# Patient Record
Sex: MALE | Race: WHITE | NOT HISPANIC OR LATINO | Employment: OTHER | URBAN - METROPOLITAN AREA
[De-identification: names, ages, dates, MRNs, and addresses within clinical notes are randomized per-mention and may not be internally consistent; named-entity substitution may affect disease eponyms.]

---

## 2021-01-15 DIAGNOSIS — Z23 NEED FOR VACCINATION: ICD-10-CM

## 2024-12-27 ENCOUNTER — HOSPITAL ENCOUNTER (INPATIENT)
Facility: MEDICAL CENTER | Age: 74
LOS: 2 days | End: 2024-12-29
Attending: EMERGENCY MEDICINE | Admitting: STUDENT IN AN ORGANIZED HEALTH CARE EDUCATION/TRAINING PROGRAM
Payer: COMMERCIAL

## 2024-12-27 ENCOUNTER — APPOINTMENT (OUTPATIENT)
Dept: RADIOLOGY | Facility: MEDICAL CENTER | Age: 74
End: 2024-12-27
Attending: STUDENT IN AN ORGANIZED HEALTH CARE EDUCATION/TRAINING PROGRAM
Payer: COMMERCIAL

## 2024-12-27 ENCOUNTER — APPOINTMENT (OUTPATIENT)
Dept: RADIOLOGY | Facility: MEDICAL CENTER | Age: 74
End: 2024-12-27
Attending: EMERGENCY MEDICINE
Payer: COMMERCIAL

## 2024-12-27 DIAGNOSIS — Z86.79 HISTORY OF CHF (CONGESTIVE HEART FAILURE): ICD-10-CM

## 2024-12-27 DIAGNOSIS — J18.9 PNEUMONIA OF RIGHT MIDDLE LOBE DUE TO INFECTIOUS ORGANISM: Primary | ICD-10-CM

## 2024-12-27 DIAGNOSIS — R55 SYNCOPE, UNSPECIFIED SYNCOPE TYPE: ICD-10-CM

## 2024-12-27 PROBLEM — Z71.89 ACP (ADVANCE CARE PLANNING): Status: ACTIVE | Noted: 2024-12-27

## 2024-12-27 PROBLEM — I48.91 AF (ATRIAL FIBRILLATION) (HCC): Status: ACTIVE | Noted: 2024-12-27

## 2024-12-27 PROBLEM — I50.20 HEART FAILURE WITH REDUCED EJECTION FRACTION (HCC): Status: ACTIVE | Noted: 2024-12-27

## 2024-12-27 PROBLEM — I42.9 CARDIOMYOPATHY (HCC): Status: ACTIVE | Noted: 2024-12-27

## 2024-12-27 PROBLEM — R74.01 TRANSAMINITIS: Status: ACTIVE | Noted: 2024-12-27

## 2024-12-27 LAB
ALBUMIN SERPL BCP-MCNC: 3.4 G/DL (ref 3.2–4.9)
ALBUMIN/GLOB SERPL: 0.8 G/DL
ALP SERPL-CCNC: 84 U/L (ref 30–99)
ALT SERPL-CCNC: 177 U/L (ref 2–50)
ANION GAP SERPL CALC-SCNC: 15 MMOL/L (ref 7–16)
APPEARANCE UR: CLEAR
AST SERPL-CCNC: 205 U/L (ref 12–45)
BACTERIA #/AREA URNS HPF: ABNORMAL /HPF
BACTERIA BLD CULT: NORMAL
BACTERIA BLD CULT: NORMAL
BASOPHILS # BLD AUTO: 0 % (ref 0–1.8)
BASOPHILS # BLD: 0 K/UL (ref 0–0.12)
BILIRUB SERPL-MCNC: 1.4 MG/DL (ref 0.1–1.5)
BILIRUB UR QL STRIP.AUTO: ABNORMAL
BUN SERPL-MCNC: 22 MG/DL (ref 8–22)
CALCIUM ALBUM COR SERPL-MCNC: 9.4 MG/DL (ref 8.5–10.5)
CALCIUM SERPL-MCNC: 8.9 MG/DL (ref 8.5–10.5)
CASTS URNS QL MICRO: ABNORMAL /LPF (ref 0–2)
CHLORIDE SERPL-SCNC: 103 MMOL/L (ref 96–112)
CO2 SERPL-SCNC: 18 MMOL/L (ref 20–33)
COLOR UR: ABNORMAL
CREAT SERPL-MCNC: 0.98 MG/DL (ref 0.5–1.4)
EKG IMPRESSION: NORMAL
EOSINOPHIL # BLD AUTO: 0 K/UL (ref 0–0.51)
EOSINOPHIL NFR BLD: 0 % (ref 0–6.9)
EPITHELIAL CELLS 1715: ABNORMAL /HPF (ref 0–5)
ERYTHROCYTE [DISTWIDTH] IN BLOOD BY AUTOMATED COUNT: 46.1 FL (ref 35.9–50)
GFR SERPLBLD CREATININE-BSD FMLA CKD-EPI: 80 ML/MIN/1.73 M 2
GLOBULIN SER CALC-MCNC: 4.4 G/DL (ref 1.9–3.5)
GLUCOSE SERPL-MCNC: 112 MG/DL (ref 65–99)
GLUCOSE UR STRIP.AUTO-MCNC: >=1000 MG/DL
HCT VFR BLD AUTO: 36.5 % (ref 42–52)
HGB BLD-MCNC: 12.2 G/DL (ref 14–18)
KETONES UR STRIP.AUTO-MCNC: 15 MG/DL
LEUKOCYTE ESTERASE UR QL STRIP.AUTO: NEGATIVE
LYMPHOCYTES # BLD AUTO: 0.91 K/UL (ref 1–4.8)
LYMPHOCYTES NFR BLD: 7 % (ref 22–41)
MAGNESIUM SERPL-MCNC: 1.9 MG/DL (ref 1.5–2.5)
MANUAL DIFF BLD: NORMAL
MCH RBC QN AUTO: 32.9 PG (ref 27–33)
MCHC RBC AUTO-ENTMCNC: 33.4 G/DL (ref 32.3–36.5)
MCV RBC AUTO: 98.4 FL (ref 81.4–97.8)
MICRO URNS: ABNORMAL
MONOCYTES # BLD AUTO: 1.13 K/UL (ref 0–0.85)
MONOCYTES NFR BLD AUTO: 8.7 % (ref 0–13.4)
MORPHOLOGY BLD-IMP: NORMAL
NEUTROPHILS # BLD AUTO: 10.96 K/UL (ref 1.82–7.42)
NEUTROPHILS NFR BLD: 80 % (ref 44–72)
NEUTS BAND NFR BLD MANUAL: 4.3 % (ref 0–10)
NITRITE UR QL STRIP.AUTO: NEGATIVE
NRBC # BLD AUTO: 0 K/UL
NRBC BLD-RTO: 0 /100 WBC (ref 0–0.2)
NT-PROBNP SERPL IA-MCNC: 3310 PG/ML (ref 0–125)
PH UR STRIP.AUTO: 5 [PH] (ref 5–8)
PLATELET # BLD AUTO: 294 K/UL (ref 164–446)
PLATELET BLD QL SMEAR: NORMAL
PMV BLD AUTO: 9 FL (ref 9–12.9)
POTASSIUM SERPL-SCNC: 3.7 MMOL/L (ref 3.6–5.5)
PROCALCITONIN SERPL-MCNC: 3.5 NG/ML
PROT SERPL-MCNC: 7.8 G/DL (ref 6–8.2)
PROT UR QL STRIP: 30 MG/DL
RBC # BLD AUTO: 3.71 M/UL (ref 4.7–6.1)
RBC # URNS HPF: ABNORMAL /HPF (ref 0–2)
RBC BLD AUTO: NORMAL
RBC UR QL AUTO: ABNORMAL
SIGNIFICANT IND 70042: NORMAL
SIGNIFICANT IND 70042: NORMAL
SITE SITE: NORMAL
SITE SITE: NORMAL
SODIUM SERPL-SCNC: 136 MMOL/L (ref 135–145)
SOURCE SOURCE: NORMAL
SOURCE SOURCE: NORMAL
SP GR UR STRIP.AUTO: 1.03
TROPONIN T SERPL-MCNC: 20 NG/L (ref 6–19)
TROPONIN T SERPL-MCNC: 25 NG/L (ref 6–19)
UROBILINOGEN UR STRIP.AUTO-MCNC: 1 EU/DL
WBC # BLD AUTO: 13 K/UL (ref 4.8–10.8)
WBC #/AREA URNS HPF: ABNORMAL /HPF

## 2024-12-27 PROCEDURE — 85027 COMPLETE CBC AUTOMATED: CPT

## 2024-12-27 PROCEDURE — 70450 CT HEAD/BRAIN W/O DYE: CPT

## 2024-12-27 PROCEDURE — 81001 URINALYSIS AUTO W/SCOPE: CPT

## 2024-12-27 PROCEDURE — 96365 THER/PROPH/DIAG IV INF INIT: CPT

## 2024-12-27 PROCEDURE — 700111 HCHG RX REV CODE 636 W/ 250 OVERRIDE (IP): Mod: JZ | Performed by: EMERGENCY MEDICINE

## 2024-12-27 PROCEDURE — 84145 PROCALCITONIN (PCT): CPT

## 2024-12-27 PROCEDURE — 99497 ADVNCD CARE PLAN 30 MIN: CPT | Performed by: STUDENT IN AN ORGANIZED HEALTH CARE EDUCATION/TRAINING PROGRAM

## 2024-12-27 PROCEDURE — 36415 COLL VENOUS BLD VENIPUNCTURE: CPT

## 2024-12-27 PROCEDURE — 76705 ECHO EXAM OF ABDOMEN: CPT

## 2024-12-27 PROCEDURE — 99285 EMERGENCY DEPT VISIT HI MDM: CPT

## 2024-12-27 PROCEDURE — 84484 ASSAY OF TROPONIN QUANT: CPT | Mod: 91

## 2024-12-27 PROCEDURE — 83735 ASSAY OF MAGNESIUM: CPT

## 2024-12-27 PROCEDURE — A9270 NON-COVERED ITEM OR SERVICE: HCPCS | Performed by: STUDENT IN AN ORGANIZED HEALTH CARE EDUCATION/TRAINING PROGRAM

## 2024-12-27 PROCEDURE — 87040 BLOOD CULTURE FOR BACTERIA: CPT | Mod: 91

## 2024-12-27 PROCEDURE — 93005 ELECTROCARDIOGRAM TRACING: CPT | Mod: TC | Performed by: EMERGENCY MEDICINE

## 2024-12-27 PROCEDURE — 99223 1ST HOSP IP/OBS HIGH 75: CPT | Performed by: STUDENT IN AN ORGANIZED HEALTH CARE EDUCATION/TRAINING PROGRAM

## 2024-12-27 PROCEDURE — 700105 HCHG RX REV CODE 258: Performed by: STUDENT IN AN ORGANIZED HEALTH CARE EDUCATION/TRAINING PROGRAM

## 2024-12-27 PROCEDURE — 700102 HCHG RX REV CODE 250 W/ 637 OVERRIDE(OP): Performed by: STUDENT IN AN ORGANIZED HEALTH CARE EDUCATION/TRAINING PROGRAM

## 2024-12-27 PROCEDURE — 700102 HCHG RX REV CODE 250 W/ 637 OVERRIDE(OP): Performed by: EMERGENCY MEDICINE

## 2024-12-27 PROCEDURE — 80053 COMPREHEN METABOLIC PANEL: CPT

## 2024-12-27 PROCEDURE — 700105 HCHG RX REV CODE 258: Performed by: EMERGENCY MEDICINE

## 2024-12-27 PROCEDURE — 71045 X-RAY EXAM CHEST 1 VIEW: CPT

## 2024-12-27 PROCEDURE — 83880 ASSAY OF NATRIURETIC PEPTIDE: CPT

## 2024-12-27 PROCEDURE — A9270 NON-COVERED ITEM OR SERVICE: HCPCS | Performed by: EMERGENCY MEDICINE

## 2024-12-27 PROCEDURE — 85007 BL SMEAR W/DIFF WBC COUNT: CPT

## 2024-12-27 PROCEDURE — 770020 HCHG ROOM/CARE - TELE (206)

## 2024-12-27 RX ORDER — FLUTICASONE PROPIONATE 50 MCG
1 SPRAY, SUSPENSION (ML) NASAL DAILY
COMMUNITY

## 2024-12-27 RX ORDER — LIDOCAINE 50 MG/G
1 PATCH TOPICAL EVERY 24 HOURS
COMMUNITY
End: 2025-01-13

## 2024-12-27 RX ORDER — AZITHROMYCIN 250 MG/1
500 TABLET, FILM COATED ORAL ONCE
Status: COMPLETED | OUTPATIENT
Start: 2024-12-27 | End: 2024-12-27

## 2024-12-27 RX ORDER — AZITHROMYCIN 250 MG/1
500 TABLET, FILM COATED ORAL DAILY
Status: COMPLETED | OUTPATIENT
Start: 2024-12-28 | End: 2024-12-29

## 2024-12-27 RX ORDER — AMIODARONE HYDROCHLORIDE 200 MG/1
200 TABLET ORAL DAILY
COMMUNITY

## 2024-12-27 RX ORDER — SPIRONOLACTONE 25 MG/1
12.5 TABLET ORAL
Status: DISCONTINUED | OUTPATIENT
Start: 2024-12-28 | End: 2024-12-29 | Stop reason: HOSPADM

## 2024-12-27 RX ORDER — EPLERENONE 25 MG/1
25 TABLET, FILM COATED ORAL DAILY
COMMUNITY

## 2024-12-27 RX ORDER — SACUBITRIL AND VALSARTAN 24; 26 MG/1; MG/1
0.5 TABLET, FILM COATED ORAL 2 TIMES DAILY
COMMUNITY

## 2024-12-27 RX ORDER — LABETALOL HYDROCHLORIDE 5 MG/ML
10 INJECTION, SOLUTION INTRAVENOUS EVERY 4 HOURS PRN
Status: DISCONTINUED | OUTPATIENT
Start: 2024-12-27 | End: 2024-12-29

## 2024-12-27 RX ORDER — MEXILETINE HYDROCHLORIDE 150 MG/1
150 CAPSULE ORAL 3 TIMES DAILY
COMMUNITY

## 2024-12-27 RX ORDER — ONDANSETRON 4 MG/1
4 TABLET, ORALLY DISINTEGRATING ORAL EVERY 4 HOURS PRN
Status: DISCONTINUED | OUTPATIENT
Start: 2024-12-27 | End: 2024-12-29 | Stop reason: HOSPADM

## 2024-12-27 RX ORDER — MEXILETINE HYDROCHLORIDE 150 MG/1
150 CAPSULE ORAL 3 TIMES DAILY
Status: DISCONTINUED | OUTPATIENT
Start: 2024-12-27 | End: 2024-12-29 | Stop reason: HOSPADM

## 2024-12-27 RX ORDER — SODIUM CHLORIDE 9 MG/ML
INJECTION, SOLUTION INTRAVENOUS CONTINUOUS
Status: DISCONTINUED | OUTPATIENT
Start: 2024-12-27 | End: 2024-12-28

## 2024-12-27 RX ORDER — METOPROLOL SUCCINATE 25 MG/1
12.5 TABLET, EXTENDED RELEASE ORAL DAILY
COMMUNITY

## 2024-12-27 RX ORDER — ONDANSETRON 2 MG/ML
4 INJECTION INTRAMUSCULAR; INTRAVENOUS EVERY 4 HOURS PRN
Status: DISCONTINUED | OUTPATIENT
Start: 2024-12-27 | End: 2024-12-29 | Stop reason: HOSPADM

## 2024-12-27 RX ORDER — ACETAMINOPHEN 325 MG/1
650 TABLET ORAL EVERY 6 HOURS PRN
Status: DISCONTINUED | OUTPATIENT
Start: 2024-12-27 | End: 2024-12-29 | Stop reason: HOSPADM

## 2024-12-27 RX ORDER — MEGESTROL ACETATE 40 MG/1
20 TABLET ORAL 4 TIMES DAILY
COMMUNITY

## 2024-12-27 RX ORDER — METOPROLOL SUCCINATE 25 MG/1
12.5 TABLET, EXTENDED RELEASE ORAL DAILY
Status: DISCONTINUED | OUTPATIENT
Start: 2024-12-28 | End: 2024-12-29 | Stop reason: HOSPADM

## 2024-12-27 RX ORDER — EPLERENONE 25 MG/1
25 TABLET, FILM COATED ORAL DAILY
Status: DISCONTINUED | OUTPATIENT
Start: 2024-12-28 | End: 2024-12-27

## 2024-12-27 RX ADMIN — MEXILETINE HYDROCHLORIDE 150 MG: 150 CAPSULE ORAL at 22:49

## 2024-12-27 RX ADMIN — AMPICILLIN AND SULBACTAM 3 G: 2; 1 INJECTION, POWDER, FOR SOLUTION INTRAVENOUS at 20:56

## 2024-12-27 RX ADMIN — SACUBITRIL AND VALSARTAN 0.5 TABLET: 24; 26 TABLET, FILM COATED ORAL at 22:48

## 2024-12-27 RX ADMIN — AZITHROMYCIN DIHYDRATE 500 MG: 250 TABLET ORAL at 20:57

## 2024-12-27 RX ADMIN — SODIUM CHLORIDE: 9 INJECTION, SOLUTION INTRAVENOUS at 22:50

## 2024-12-27 RX ADMIN — APIXABAN 5 MG: 5 TABLET, FILM COATED ORAL at 22:48

## 2024-12-27 ASSESSMENT — CHA2DS2 SCORE
SEX: MALE
PRIOR STROKE OR TIA OR THROMBOEMBOLISM: NO
CHA2DS2 VASC SCORE: 4
AGE 75 OR GREATER: YES
HYPERTENSION: NO
CHF OR LEFT VENTRICULAR DYSFUNCTION: YES
DIABETES: NO
AGE 65 TO 74: NO
VASCULAR DISEASE: YES

## 2024-12-27 ASSESSMENT — ENCOUNTER SYMPTOMS
WEAKNESS: 1
EYES NEGATIVE: 1
LOSS OF CONSCIOUSNESS: 1
MUSCULOSKELETAL NEGATIVE: 1
SHORTNESS OF BREATH: 1
COUGH: 1
CARDIOVASCULAR NEGATIVE: 1
PSYCHIATRIC NEGATIVE: 1
GASTROINTESTINAL NEGATIVE: 1

## 2024-12-28 LAB
ALBUMIN SERPL BCP-MCNC: 3.1 G/DL (ref 3.2–4.9)
ALBUMIN/GLOB SERPL: 0.7 G/DL
ALP SERPL-CCNC: 87 U/L (ref 30–99)
ALT SERPL-CCNC: 396 U/L (ref 2–50)
ANION GAP SERPL CALC-SCNC: 16 MMOL/L (ref 7–16)
AST SERPL-CCNC: 523 U/L (ref 12–45)
BASOPHILS # BLD AUTO: 0 % (ref 0–1.8)
BASOPHILS # BLD: 0 K/UL (ref 0–0.12)
BILIRUB SERPL-MCNC: 1.3 MG/DL (ref 0.1–1.5)
BUN SERPL-MCNC: 22 MG/DL (ref 8–22)
CALCIUM ALBUM COR SERPL-MCNC: 9.6 MG/DL (ref 8.5–10.5)
CALCIUM SERPL-MCNC: 8.9 MG/DL (ref 8.5–10.5)
CHLORIDE SERPL-SCNC: 103 MMOL/L (ref 96–112)
CO2 SERPL-SCNC: 17 MMOL/L (ref 20–33)
CREAT SERPL-MCNC: 0.95 MG/DL (ref 0.5–1.4)
EOSINOPHIL # BLD AUTO: 0 K/UL (ref 0–0.51)
EOSINOPHIL NFR BLD: 0 % (ref 0–6.9)
ERYTHROCYTE [DISTWIDTH] IN BLOOD BY AUTOMATED COUNT: 47.3 FL (ref 35.9–50)
GFR SERPLBLD CREATININE-BSD FMLA CKD-EPI: 83 ML/MIN/1.73 M 2
GLOBULIN SER CALC-MCNC: 4.3 G/DL (ref 1.9–3.5)
GLUCOSE SERPL-MCNC: 107 MG/DL (ref 65–99)
HCT VFR BLD AUTO: 35.6 % (ref 42–52)
HGB BLD-MCNC: 12 G/DL (ref 14–18)
LYMPHOCYTES # BLD AUTO: 1.24 K/UL (ref 1–4.8)
LYMPHOCYTES NFR BLD: 9.6 % (ref 22–41)
MANUAL DIFF BLD: NORMAL
MCH RBC QN AUTO: 33.3 PG (ref 27–33)
MCHC RBC AUTO-ENTMCNC: 33.7 G/DL (ref 32.3–36.5)
MCV RBC AUTO: 98.9 FL (ref 81.4–97.8)
MONOCYTES # BLD AUTO: 1.01 K/UL (ref 0–0.85)
MONOCYTES NFR BLD AUTO: 7.8 % (ref 0–13.4)
MORPHOLOGY BLD-IMP: NORMAL
NEUTROPHILS # BLD AUTO: 10.66 K/UL (ref 1.82–7.42)
NEUTROPHILS NFR BLD: 82.6 % (ref 44–72)
NRBC # BLD AUTO: 0 K/UL
NRBC BLD-RTO: 0 /100 WBC (ref 0–0.2)
PLATELET # BLD AUTO: 258 K/UL (ref 164–446)
PLATELET BLD QL SMEAR: NORMAL
PMV BLD AUTO: 8.8 FL (ref 9–12.9)
POTASSIUM SERPL-SCNC: 3.7 MMOL/L (ref 3.6–5.5)
PROT SERPL-MCNC: 7.4 G/DL (ref 6–8.2)
RBC # BLD AUTO: 3.6 M/UL (ref 4.7–6.1)
RBC BLD AUTO: NORMAL
SODIUM SERPL-SCNC: 136 MMOL/L (ref 135–145)
WBC # BLD AUTO: 12.9 K/UL (ref 4.8–10.8)

## 2024-12-28 PROCEDURE — 97535 SELF CARE MNGMENT TRAINING: CPT

## 2024-12-28 PROCEDURE — 97163 PT EVAL HIGH COMPLEX 45 MIN: CPT

## 2024-12-28 PROCEDURE — A9270 NON-COVERED ITEM OR SERVICE: HCPCS | Performed by: STUDENT IN AN ORGANIZED HEALTH CARE EDUCATION/TRAINING PROGRAM

## 2024-12-28 PROCEDURE — 700105 HCHG RX REV CODE 258: Performed by: STUDENT IN AN ORGANIZED HEALTH CARE EDUCATION/TRAINING PROGRAM

## 2024-12-28 PROCEDURE — 700111 HCHG RX REV CODE 636 W/ 250 OVERRIDE (IP): Mod: JZ | Performed by: STUDENT IN AN ORGANIZED HEALTH CARE EDUCATION/TRAINING PROGRAM

## 2024-12-28 PROCEDURE — 85007 BL SMEAR W/DIFF WBC COUNT: CPT

## 2024-12-28 PROCEDURE — 36415 COLL VENOUS BLD VENIPUNCTURE: CPT

## 2024-12-28 PROCEDURE — 97166 OT EVAL MOD COMPLEX 45 MIN: CPT

## 2024-12-28 PROCEDURE — 700102 HCHG RX REV CODE 250 W/ 637 OVERRIDE(OP): Performed by: STUDENT IN AN ORGANIZED HEALTH CARE EDUCATION/TRAINING PROGRAM

## 2024-12-28 PROCEDURE — 85027 COMPLETE CBC AUTOMATED: CPT

## 2024-12-28 PROCEDURE — 80053 COMPREHEN METABOLIC PANEL: CPT

## 2024-12-28 PROCEDURE — 700105 HCHG RX REV CODE 258: Performed by: INTERNAL MEDICINE

## 2024-12-28 PROCEDURE — 99233 SBSQ HOSP IP/OBS HIGH 50: CPT | Performed by: INTERNAL MEDICINE

## 2024-12-28 PROCEDURE — 770020 HCHG ROOM/CARE - TELE (206)

## 2024-12-28 PROCEDURE — 700111 HCHG RX REV CODE 636 W/ 250 OVERRIDE (IP): Mod: JZ | Performed by: INTERNAL MEDICINE

## 2024-12-28 RX ORDER — SODIUM CHLORIDE 9 MG/ML
500 INJECTION, SOLUTION INTRAVENOUS ONCE
Status: COMPLETED | OUTPATIENT
Start: 2024-12-28 | End: 2024-12-28

## 2024-12-28 RX ADMIN — AMPICILLIN AND SULBACTAM 3 G: 1; 2 INJECTION, POWDER, FOR SOLUTION INTRAMUSCULAR; INTRAVENOUS at 12:51

## 2024-12-28 RX ADMIN — APIXABAN 5 MG: 5 TABLET, FILM COATED ORAL at 05:19

## 2024-12-28 RX ADMIN — SACUBITRIL AND VALSARTAN 0.5 TABLET: 24; 26 TABLET, FILM COATED ORAL at 05:18

## 2024-12-28 RX ADMIN — MEXILETINE HYDROCHLORIDE 150 MG: 150 CAPSULE ORAL at 13:54

## 2024-12-28 RX ADMIN — SACUBITRIL AND VALSARTAN 0.5 TABLET: 24; 26 TABLET, FILM COATED ORAL at 17:13

## 2024-12-28 RX ADMIN — SODIUM CHLORIDE 500 ML: 9 INJECTION, SOLUTION INTRAVENOUS at 10:28

## 2024-12-28 RX ADMIN — AMPICILLIN AND SULBACTAM 3 G: 2; 1 INJECTION, POWDER, FOR SOLUTION INTRAVENOUS at 03:51

## 2024-12-28 RX ADMIN — MEXILETINE HYDROCHLORIDE 150 MG: 150 CAPSULE ORAL at 22:05

## 2024-12-28 RX ADMIN — APIXABAN 5 MG: 5 TABLET, FILM COATED ORAL at 17:13

## 2024-12-28 RX ADMIN — ACETAMINOPHEN 650 MG: 325 TABLET ORAL at 20:56

## 2024-12-28 RX ADMIN — AZITHROMYCIN DIHYDRATE 500 MG: 250 TABLET ORAL at 17:13

## 2024-12-28 RX ADMIN — SPIRONOLACTONE 12.5 MG: 25 TABLET ORAL at 05:19

## 2024-12-28 RX ADMIN — MEXILETINE HYDROCHLORIDE 150 MG: 150 CAPSULE ORAL at 05:19

## 2024-12-28 RX ADMIN — AMPICILLIN AND SULBACTAM 3 G: 1; 2 INJECTION, POWDER, FOR SOLUTION INTRAMUSCULAR; INTRAVENOUS at 17:18

## 2024-12-28 SDOH — ECONOMIC STABILITY: TRANSPORTATION INSECURITY
IN THE PAST 12 MONTHS, HAS LACK OF RELIABLE TRANSPORTATION KEPT YOU FROM MEDICAL APPOINTMENTS, MEETINGS, WORK OR FROM GETTING THINGS NEEDED FOR DAILY LIVING?: NO

## 2024-12-28 SDOH — ECONOMIC STABILITY: TRANSPORTATION INSECURITY
IN THE PAST 12 MONTHS, HAS THE LACK OF TRANSPORTATION KEPT YOU FROM MEDICAL APPOINTMENTS OR FROM GETTING MEDICATIONS?: NO

## 2024-12-28 ASSESSMENT — PATIENT HEALTH QUESTIONNAIRE - PHQ9
SUM OF ALL RESPONSES TO PHQ QUESTIONS 1-9: 10
SUM OF ALL RESPONSES TO PHQ9 QUESTIONS 1 AND 2: 2
6. FEELING BAD ABOUT YOURSELF - OR THAT YOU ARE A FAILURE OR HAVE LET YOURSELF OR YOUR FAMILY DOWN: SEVERAL DAYS
5. POOR APPETITE OR OVEREATING: SEVERAL DAYS
1. LITTLE INTEREST OR PLEASURE IN DOING THINGS: SEVERAL DAYS
7. TROUBLE CONCENTRATING ON THINGS, SUCH AS READING THE NEWSPAPER OR WATCHING TELEVISION: SEVERAL DAYS
4. FEELING TIRED OR HAVING LITTLE ENERGY: MORE THAN HALF THE DAYS
3. TROUBLE FALLING OR STAYING ASLEEP OR SLEEPING TOO MUCH: NEARLY EVERY DAY
2. FEELING DOWN, DEPRESSED, IRRITABLE, OR HOPELESS: SEVERAL DAYS
8. MOVING OR SPEAKING SO SLOWLY THAT OTHER PEOPLE COULD HAVE NOTICED. OR THE OPPOSITE, BEING SO FIGETY OR RESTLESS THAT YOU HAVE BEEN MOVING AROUND A LOT MORE THAN USUAL: NOT AT ALL
9. THOUGHTS THAT YOU WOULD BE BETTER OFF DEAD, OR OF HURTING YOURSELF: NOT AT ALL

## 2024-12-28 ASSESSMENT — SOCIAL DETERMINANTS OF HEALTH (SDOH)
WITHIN THE LAST YEAR, HAVE YOU BEEN AFRAID OF YOUR PARTNER OR EX-PARTNER?: NO
WITHIN THE PAST 12 MONTHS, THE FOOD YOU BOUGHT JUST DIDN'T LAST AND YOU DIDN'T HAVE MONEY TO GET MORE: NEVER TRUE
WITHIN THE LAST YEAR, HAVE YOU BEEN KICKED, HIT, SLAPPED, OR OTHERWISE PHYSICALLY HURT BY YOUR PARTNER OR EX-PARTNER?: NO
WITHIN THE LAST YEAR, HAVE YOU BEEN HUMILIATED OR EMOTIONALLY ABUSED IN OTHER WAYS BY YOUR PARTNER OR EX-PARTNER?: NO
IN THE PAST 12 MONTHS, HAS THE ELECTRIC, GAS, OIL, OR WATER COMPANY THREATENED TO SHUT OFF SERVICE IN YOUR HOME?: NO
WITHIN THE PAST 12 MONTHS, YOU WORRIED THAT YOUR FOOD WOULD RUN OUT BEFORE YOU GOT THE MONEY TO BUY MORE: NEVER TRUE
WITHIN THE LAST YEAR, HAVE TO BEEN RAPED OR FORCED TO HAVE ANY KIND OF SEXUAL ACTIVITY BY YOUR PARTNER OR EX-PARTNER?: NO

## 2024-12-28 ASSESSMENT — COGNITIVE AND FUNCTIONAL STATUS - GENERAL
DAILY ACTIVITIY SCORE: 20
MOVING FROM LYING ON BACK TO SITTING ON SIDE OF FLAT BED: A LITTLE
WALKING IN HOSPITAL ROOM: A LITTLE
SUGGESTED CMS G CODE MODIFIER DAILY ACTIVITY: CJ
TURNING FROM BACK TO SIDE WHILE IN FLAT BAD: A LITTLE
DRESSING REGULAR UPPER BODY CLOTHING: A LITTLE
SUGGESTED CMS G CODE MODIFIER MOBILITY: CK
DRESSING REGULAR LOWER BODY CLOTHING: A LOT
STANDING UP FROM CHAIR USING ARMS: A LITTLE
HELP NEEDED FOR BATHING: A LITTLE
MOVING TO AND FROM BED TO CHAIR: A LITTLE
CLIMB 3 TO 5 STEPS WITH RAILING: A LOT
MOBILITY SCORE: 17

## 2024-12-28 ASSESSMENT — ENCOUNTER SYMPTOMS
LOSS OF CONSCIOUSNESS: 1
NAUSEA: 0
DEPRESSION: 0
FEVER: 0
WEAKNESS: 1
CHILLS: 0
SHORTNESS OF BREATH: 0
ABDOMINAL PAIN: 0
MYALGIAS: 0
HEADACHES: 0
DIZZINESS: 0

## 2024-12-28 ASSESSMENT — LIFESTYLE VARIABLES
AVERAGE NUMBER OF DAYS PER WEEK YOU HAVE A DRINK CONTAINING ALCOHOL: 0
HAVE PEOPLE ANNOYED YOU BY CRITICIZING YOUR DRINKING: NO
CONSUMPTION TOTAL: NEGATIVE
TOTAL SCORE: 0
ON A TYPICAL DAY WHEN YOU DRINK ALCOHOL HOW MANY DRINKS DO YOU HAVE: 0
EVER FELT BAD OR GUILTY ABOUT YOUR DRINKING: NO
ALCOHOL_USE: NO
TOTAL SCORE: 0
DOES PATIENT WANT TO STOP DRINKING: NO
EVER HAD A DRINK FIRST THING IN THE MORNING TO STEADY YOUR NERVES TO GET RID OF A HANGOVER: NO
HOW MANY TIMES IN THE PAST YEAR HAVE YOU HAD 5 OR MORE DRINKS IN A DAY: 0
HAVE YOU EVER FELT YOU SHOULD CUT DOWN ON YOUR DRINKING: NO
TOTAL SCORE: 0

## 2024-12-28 ASSESSMENT — GAIT ASSESSMENTS
ASSISTIVE DEVICE: FRONT WHEEL WALKER
GAIT LEVEL OF ASSIST: CONTACT GUARD ASSIST
DEVIATION: BRADYKINETIC
DISTANCE (FEET): 10

## 2024-12-28 ASSESSMENT — ACTIVITIES OF DAILY LIVING (ADL): TOILETING: INDEPENDENT

## 2024-12-28 ASSESSMENT — PAIN DESCRIPTION - PAIN TYPE
TYPE: ACUTE PAIN
TYPE: ACUTE PAIN

## 2024-12-28 ASSESSMENT — FIBROSIS 4 INDEX
FIB4 SCORE: 7.74
FIB4 SCORE: 3.98

## 2024-12-28 NOTE — ASSESSMENT & PLAN NOTE
Endorses fever chills and dry cough.  Found to have leukocytosis and elevated procalcitonin.  Chest x-ray showing pneumonitis  Unasyn and azithromycin  Oxygen as needed

## 2024-12-28 NOTE — ASSESSMENT & PLAN NOTE
Per chart review, has improved ejection fraction, initially 10%, now 30 to 40%  Gentle hydration  Resume home medications

## 2024-12-28 NOTE — THERAPY
"Occupational Therapy   Initial Evaluation     Patient Name: Armand Patel  Age:  74 y.o., Sex:  male  Medical Record #: [id number]  Today's Date: 12/28/2024     Precautions  Precautions: (P) Fall Risk    Assessment  Patient is 74 y.o. male admitted d/t syncope. PMHx: nonischemic cardiomyopathy with EF 35 to 40% and CRT-D, pulmonary embolism on Eliquis, severe aortic stenosis, ventricular tachycardia on amiodarone   Pt seen for OT eval. Pt with positive orthostatic hypotension, most likely contributing to syncope and fall at home. Pt required overall CGA for functional mobility with use of FWW. Pt educ on use of sock aide for Lb dressing d/t difficulty managing LB dressing. Pt educ on home safety, DME and A/E use. Pt will continue to benefit from inpt OT, will be safe to dc home when medically  stable.     Plan    Occupational Therapy Initial Treatment Plan   Treatment Interventions: (P) Self Care / Activities of Daily Living, Neuro Re-Education / Balance, Therapeutic Exercises, Therapeutic Activity, Adaptive Equipment  Treatment Frequency: (P) 4 Times per Week  Duration: (P) Until Therapy Goals Met    DC Equipment Recommendations: (P) None  Discharge Recommendations: (P) Recommend home health for continued occupational therapy services     Subjective    \"That would be helpful\" (sock aide)      Objective       12/28/24 0936   Prior Living Situation   Prior Services Home-Independent   Housing / Facility 2 Story House   Steps Into Home   (ramp to enter)   Steps In Home   (FOS)   Elevator Yes   Bathroom Set up Walk In Shower;Grab Bars;Shower Chair   Equipment Owned 4-Wheel Walker;Grab Bar(s) In Tub / Shower;Tub / Shower Seat;Grab Bar(s) By Toilet   Lives with - Patient's Self Care Capacity Spouse;Adult Children;Other (Comments)   Comments Pt lives with spouse, adult son and  2 grandchildren under the age of 8   Prior Level of ADL Function   Self Feeding Independent   Grooming / Hygiene Independent   Bathing Independent "   Dressing Independent   Toileting Independent   Prior Level of IADL Function   Medication Management Independent   Laundry Requires Assist   Kitchen Mobility Independent   Finances Requires Assist   Home Management Requires Assist   Shopping Requires Assist   Prior Level Of Mobility Independent With Device in Community   Driving / Transportation Relatives / Others Provide Transportation   History of Falls   History of Falls Yes   Date of Last Fall   (reason for admit)   Precautions   Precautions Fall Risk   Vitals   Patient BP Position Orthostatic:;Supine;Standing 1 minute;Standing 3 minutes   Vitals Comments 94/58 improved once up in chair   Pain   Intervention Declines   Pain 0 - 10 Group   Therapist Pain Assessment During Activity;Post Activity Pain Same as Prior to Activity;Nurse Notified  (did not c/o pain)   Cognition    Cognition / Consciousness WDL   Level of Consciousness Alert   Comments very pleasant and cooperative, receptive to all educ   Passive ROM Upper Body   Passive ROM Upper Body WDL   Active ROM Upper Body   Active ROM Upper Body  WDL   Dominant Hand Right   Strength Upper Body   Upper Body Strength  X   Gross Strength Generalized Weakness, Equal Bilaterally.    Sensation Upper Body   Upper Extremity Sensation  WDL   Upper Body Muscle Tone   Upper Body Muscle Tone  WDL   Neurological Concerns   Neurological Concerns No   Coordination Upper Body   Coordination WDL   Balance Assessment   Sitting Balance (Static) Good   Sitting Balance (Dynamic) Fair   Standing Balance (Static) Fair   Standing Balance (Dynamic) Fair -   Weight Shift Sitting Good   Weight Shift Standing Fair   Comments w/FWW   Bed Mobility    Supine to Sit Minimal Assist   Scooting Standby Assist   Rolling Standby Assist   Comments HOB flat   ADL Assessment   Eating Independent   Grooming Standing;Standby Assist  (oral care)   Upper Body Dressing Minimal Assist   Lower Body Dressing Moderate Assist  (with use of sock aide able to  don, intiially modA)   Toileting Standby Assist   Functional Mobility   Sit to Stand Contact Guard Assist   Bed, Chair, Wheelchair Transfer Contact Guard Assist   Toilet Transfers Contact Guard Assist   Transfer Method Stand Step   Mobility sup>sit EOB> in rm> bathroom> chair   Comments w/FWW   Visual Perception   Visual Perception  WDL   Edema / Skin Assessment   Edema / Skin  WDL   Activity Tolerance   Sitting in Chair post session   Sitting Edge of Bed 5 min   Standing 5+ min   Patient / Family Goals   Patient / Family Goal #1 to go home   Short Term Goals   Short Term Goal # 1 Pt will complete LB dressing with A/E supervised   Short Term Goal # 2 Pt will complete toileting supervised   Short Term Goal # 3 pt will complete standing grooming tasks supervised   Short Term Goal # 4 pt will complete toilet tranfers supervised   Education Group   Education Provided Role of Occupational Therapist;Activities of Daily Living   Role of Occupational Therapist Patient Response Patient;Acceptance;Explanation;Demonstration;Verbal Demonstration;Action Demonstration   ADL Patient Response Patient;Acceptance;Explanation;Demonstration;Verbal Demonstration;Action Demonstration   Occupational Therapy Initial Treatment Plan    Treatment Interventions Self Care / Activities of Daily Living;Neuro Re-Education / Balance;Therapeutic Exercises;Therapeutic Activity;Adaptive Equipment   Treatment Frequency 4 Times per Week   Duration Until Therapy Goals Met   Problem List   Problem List Decreased Active Daily Living Skills;Decreased Functional Mobility;Decreased Activity Tolerance   Anticipated Discharge Equipment and Recommendations   DC Equipment Recommendations None   Discharge Recommendations Recommend home health for continued occupational therapy services   Interdisciplinary Plan of Care Collaboration   IDT Collaboration with  Nursing;Physical Therapist   Patient Position at End of Therapy Seated;Chair Alarm On;Tray Table within  Reach;Call Light within Reach   Collaboration Comments RN updated   Session Information   Date / Session Number  12/28, #1 (1/4, 1/3)       Patient seen for team evaluation with Physical Therapist for the following reason(s):  Therapy sessions needed to be done by a certain time period for both disciplines, and did not impede patient's progress.. Occupational Therapist facilitated ADL performance while Physical Therapist simultaneously treated pt according to POC.

## 2024-12-28 NOTE — CARE PLAN
The patient is Watcher - Medium risk of patient condition declining or worsening    Shift Goals  Clinical Goals: safety, ECHO  Patient Goals: rest  Family Goals: not present    Progress made toward(s) clinical / shift goals:  safety, ECHO    Patient is not progressing towards the following goals:

## 2024-12-28 NOTE — PROGRESS NOTES
Assumed care of patient; received beside report from Ramya MILLER. Patient voices no needs at this time.

## 2024-12-28 NOTE — ED NOTES
Med Rec completed per [facility] and spouse on the phone.  [place] [phone] ( phone # in Demographic is wrong)    Allergies reviewed: yes    Antibiotics in the past 30 days: no    Anticoagulant in past 14 days: yes    Anticoagulant:Eliquis 5 mg  Last dose: 12/27/2024    Pharmacy patient utilizes: [facility]  [phone]  ext [phone]

## 2024-12-28 NOTE — PROGRESS NOTES
4 Eyes Skin Assessment Completed by ANGELA Monge and ANGELA Stephen.    Head WDL  Ears WDL  Nose WDL  Mouth WDL  Neck WDL  Breast/Chest WDL  Shoulder Blades Redness, Scabbing  Spine Redness  (R) Arm/Elbow/Hand Bruising  (L) Arm/Elbow/Hand Bruising and Scab  Abdomen WDL  Groin WDL  Scrotum/Coccyx/Buttocks Redness and Blanching  (R) Leg Redness and Bruising  (L) Leg Scab and Bruising  (R) Heel/Foot/Toe Blanching and Boggy  (L) Heel/Foot/Toe Blanching and Boggy          Devices In Places Tele Box and Pulse Ox      Interventions In Place Pillows    Possible Skin Injury No    Pictures Uploaded Into Epic Yes  Wound Consult Placed N/A  RN Wound Prevention Protocol Ordered Yes

## 2024-12-28 NOTE — THERAPY
"Physical Therapy   Initial Evaluation     Patient Name: Armadn Patel  Age:  74 y.o., Sex:  male  Medical Record #: [id number]  Today's Date: 12/28/2024     Precautions  Precautions: Fall Risk    Assessment  Patient is 74 y.o. male who states he lost his balance and fell. Reports he did lose consciousness for \"a few minutes\". Flu like symptoms. Dx'd with R sided PNA, syncope.  Hx of nonischemic cardiomyopathy with EF 35 to 40% and CRT-D, pulmonary embolism, on Eliquis, severe aortic stenosis, ventricular tachycardia, PPM, afib.     Pt presents with the following Problems: positive orthostatic hypotension Blood pressure, no c/o dizziness, Pain, Impaired Bed Mobility, Impaired Transfers, Impaired Ambulation, Decreased Activity Tolerance, Impaired Balance. Please see flowsheet below for information. Pt is currently below their functional baseline and anticipate that pt will benefit from home with home health and home with family assistance upon DC from hospital. Pt will also benefit from nursing to assist pt with mobility to include the following: up to chair for meals, ambulate in halls, and Wrote information on patient's dry erase board and notified RN..    Plan    Physical Therapy Initial Treatment Plan   Treatment Plan : Bed Mobility, Gait Training, Neuro Re-Education / Balance, Therapeutic Activities, Therapeutic Exercise, Self Care / Home Evaluation  Treatment Frequency: 4 Times per Week  Duration: Until Therapy Goals Met    DC Equipment Recommendations: None  Discharge Recommendations: Recommend home health for continued physical therapy services       Subjective    Pt with eyes closed upon entry. Agreed to PT/OT.      Objective       12/28/24 0933   Time In/Time Out   Therapy Start Time 0915   Therapy End Time 0933   Total Therapy Time 18   Initial Contact Note    Initial Contact Note Order Received and Verified, Physical Therapy Evaluation in Progress with Full Report to Follow.   Precautions   Precautions Fall " Risk   Vitals   Patient BP Position Orthostatic:   Blood Pressure    (supine 117/61 HR 80, seated 94/58 HR 89, standing 85/58 , seated after amb to/from /60 HR 90. No c/o dizziness. Notified Dr Lorenzo and RN.)   O2 Delivery Device None - Room Air   Pain 0 - 10 Group   Therapist Pain Assessment   (no c/o pain)   Prior Living Situation   Prior Services Home-Independent   Housing / Facility 2 Story House   Steps Into Home   (ramp)   Steps In Home   (1 flight of stairs, has an elevator)   Elevator Yes   Bathroom Set up Walk In Shower;Grab Bars;Shower Chair   Equipment Owned 4-Wheel Walker;Grab Bar(s) In Tub / Shower;Tub / Shower Seat;Grab Bar(s) By Toilet;Hospital Bed   Lives with - Patient's Self Care Capacity Spouse;Adult Children;Other (Comments)  (grandchildren)   Comments spouse recently DC'd from hospital and has    Prior Level of Functional Mobility   Ambulation Independent   Ambulation Distance community   Assistive Devices Used 4-Wheel Walker   Comments uses 4WW downstairs, no AD upstairs uses furniture   History of Falls   History of Falls Yes   Date of Last Fall 12/27/24  (lost balance and possible syncope)   Cognition    Cognition / Consciousness WDL   Level of Consciousness Alert   Comments pleasant, receptive, receptive to education   Active ROM Lower Body    Active ROM Lower Body  WDL   Strength Lower Body   Lower Body Strength  WDL   Coordination Lower Body    Coordination Lower Body  WDL   Balance Assessment   Sitting Balance (Static) Good   Sitting Balance (Dynamic) Fair   Standing Balance (Static) Fair   Standing Balance (Dynamic) Fair -   Weight Shift Sitting Good   Weight Shift Standing Fair   Comments standing with FWW   Bed Mobility    Supine to Sit Minimal Assist   Scooting Standby Assist   Rolling Standby Assist   Comments HOB flat -pt has a hospital bed   Gait Analysis   Gait Level Of Assist Contact Guard Assist   Assistive Device Front Wheel Walker   Distance (Feet) 10   # of  Times Distance was Traveled 2   Deviation Bradykinetic   Functional Mobility   Sit to Stand Contact Guard Assist   Bed, Chair, Wheelchair Transfer Contact Guard Assist   Transfer Method Stand Step   6 Clicks Assessment - How much HELP from from another person do you currently need... (If the patient hasn't done an activity recently, how much help from another person do you think he/she would need if he/she tried?)   Turning from your back to your side while in a flat bed without using bedrails? 3   Moving from lying on your back to sitting on the side of a flat bed without using bedrails? 3   Moving to and from a bed to a chair (including a wheelchair)? 3   Standing up from a chair using your arms (e.g., wheelchair, or bedside chair)? 3   Walking in hospital room? 3   Climbing 3-5 steps with a railing? 2   6 clicks Mobility Score 17   Activity Tolerance   Sitting in Chair post session   Sitting Edge of Bed 5 minutes   Standing 8 minutes total   Comments limited by fatigue and weakness   Short Term Goals    Short Term Goal # 1 Pt will perform supine to/from sit with flat bed and no features supervised in 6 visits to progress bed mobility   Short Term Goal # 2 Pt will perform sit to/from stand with FWW supervised in 6 visits to progress transfers   Short Term Goal # 3 Pt will amb with FWW 200ft supervised in 6 visits to progress with functional household mobility.   Education Group   Education Provided Role of Physical Therapist;Gait Training;Use of Assistive Device   Role of Physical Therapist Patient Response Patient;Acceptance;Explanation;Verbal Demonstration   Gait Training Patient Response Patient;Acceptance;Explanation;Action Demonstration   Use of Assistive Device Patient Response Patient;Acceptance;Explanation;Action Demonstration   Additional Comments Fall prevention measures- home modifications (remove floor rugs, install night lights), wear safe non skid shoes, careful with positional changes.   Physical  Therapy Initial Treatment Plan    Treatment Plan  Bed Mobility;Gait Training;Neuro Re-Education / Balance;Therapeutic Activities;Therapeutic Exercise;Self Care / Home Evaluation   Treatment Frequency 4 Times per Week   Duration Until Therapy Goals Met   Problem List    Problems Pain;Impaired Bed Mobility;Impaired Transfers;Impaired Ambulation;Decreased Activity Tolerance;Impaired Balance   Anticipated Discharge Equipment and Recommendations   DC Equipment Recommendations None   Discharge Recommendations Recommend home health for continued physical therapy services   Interdisciplinary Plan of Care Collaboration   IDT Collaboration with  Nursing;Occupational Therapist   Patient Position at End of Therapy Seated;Chair Alarm On;Tray Table within Reach;Call Light within Reach   Collaboration Comments RN updated     Patient seen for team evaluation with Occupational Therapist for the following reason(s):  Due to the medical complexity, the skill of both practitioners is needed to monitor vitals, patient status, and adjust the intervention to fit the patient's needs and goals.. Physical Therapist facilitated amb, standing balance during Adls, activity tolerance while Occupational Therapist simultaneously treated pt according to POC.

## 2024-12-28 NOTE — ASSESSMENT & PLAN NOTE
Noted to have transaminitis with  and   Right upper quadrant ultrasound shows sludge but no evidence of acute cholecystitis.  Hold amiodarone for now

## 2024-12-28 NOTE — PROGRESS NOTES
Picked up pt from ED and brought to floor. Assessment completed.Pt A&Ox 4. Respirations are even and unlabored on room air. Tele monitor in place, call light and belongings are within reach. POC updated. Pt educated on room and call light, pt verbalized understanding. Needs met.

## 2024-12-28 NOTE — CARE PLAN
The patient is Stable - Low risk of patient condition declining or worsening    Shift Goals  Clinical Goals: safety, pain control  Patient Goals: rest  Family Goals: not present      Problem: Pain - Standard  Goal: Alleviation of pain or a reduction in pain to the patient’s comfort goal  12/28/2024 0629 by Ramya Dumont RBrittanyN.  Outcome: Progressing     Problem: Knowledge Deficit - Standard  Goal: Patient and family/care givers will demonstrate understanding of plan of care, disease process/condition, diagnostic tests and medications  12/28/2024 0629 by Ramay Dumont R.N.  Outcome: Progressing     Problem: Fall Risk  Goal: Patient will remain free from falls  12/28/2024 0629 by Ramya Dumont R.N.  Outcome: Progressing

## 2024-12-28 NOTE — PROGRESS NOTES
Monitor summary: Paced 73-76, CO -0.17, QRS -0.11, QT -0.41, with a BBB per strip from the monitor room.

## 2024-12-28 NOTE — H&P
"Hospital Medicine History & Physical Note    Date of Service  12/27/2024    Primary Care Physician  No primary care provider on file.    Consultants  None    Code Status  DNAR/DNI    Chief Complaint  Chief Complaint   Patient presents with    Fall     Pt states he lost his balance and fell. Reports he did lose consciousness for \"a few minutes\". - head strike. +thinners, does not know what blood thinner.  Pt reports chronic shoulder pain.    Flu Like Symptoms     X1 week.       History of Presenting Illness  Armand Patel is a 74 y.o. male who presented 12/27/2024 with a syncopal episode.  For the last few days, patient noted to have subjective fever, chills, productive cough.  Today, he lost his balance when trying to get up after trying to grab his walker.  He had a brief episode of loss of consciousness.  He continued to feel weak after, he was then brought to the ER for further evaluation.    Patient has had an extensive cardiac history.  He has a history of nonischemic cardiomyopathy with EF 35 to 40% and CRT-D, pulmonary embolism on Eliquis, severe aortic stenosis, ventricular tachycardia on amiodarone    In ER, patient found to have normal vital signs.  Pertinent labs include neutrophil leukocytosis.  Found to have transaminitis  and .  CT head negative.  Chest x-ray showing a right-sided pneumonia.  Patient admitted for syncope and pneumonia.    I discussed the plan of care with patient.    Review of Systems  Review of Systems   Constitutional:  Positive for malaise/fatigue.   HENT: Negative.     Eyes: Negative.    Respiratory:  Positive for cough and shortness of breath.    Cardiovascular: Negative.    Gastrointestinal: Negative.    Genitourinary: Negative.    Musculoskeletal: Negative.    Skin: Negative.    Neurological:  Positive for loss of consciousness and weakness.   Endo/Heme/Allergies: Negative.    Psychiatric/Behavioral: Negative.         Past Medical History   has no past medical " history on file.    Surgical History   has no past surgical history on file.     Family History  family history is not on file.   Family history reviewed with patient. There is no family history that is pertinent to the chief complaint.     Social History       Allergies  Not on File    Medications  None       Physical Exam  Temp:  [36.9 °C (98.4 °F)] 36.9 °C (98.4 °F)  Pulse:  [85-99] 85  Resp:  [16-18] 18  BP: (114-121)/(57-62) 121/62  SpO2:  [90 %-93 %] 93 %  Blood Pressure : 121/62   Temperature: 36.9 °C (98.4 °F)   Pulse: 85   Respiration: 18   Pulse Oximetry: 93 %       Physical Exam  Constitutional:       Comments: Thin appearing male   HENT:      Head: Normocephalic.      Mouth/Throat:      Mouth: Mucous membranes are moist.   Eyes:      Pupils: Pupils are equal, round, and reactive to light.   Cardiovascular:      Rate and Rhythm: Normal rate and regular rhythm.      Pulses: Normal pulses.      Comments:   Defibrillator in place  Pulmonary:      Effort: Pulmonary effort is normal.      Breath sounds: Normal breath sounds.   Abdominal:      General: Abdomen is flat. Bowel sounds are normal.      Palpations: Abdomen is soft.   Musculoskeletal:         General: Normal range of motion.   Skin:     General: Skin is warm.   Neurological:      General: No focal deficit present.      Mental Status: He is alert and oriented to person, place, and time. Mental status is at baseline.   Psychiatric:         Mood and Affect: Mood normal.         Behavior: Behavior normal.         Thought Content: Thought content normal.         Judgment: Judgment normal.         Laboratory:  Recent Labs     12/27/24 1842   WBC 13.0*   RBC 3.71*   HEMOGLOBIN 12.2*   HEMATOCRIT 36.5*   MCV 98.4*   MCH 32.9   MCHC 33.4   RDW 46.1   PLATELETCT 294   MPV 9.0     Recent Labs     12/27/24  1842   SODIUM 136   POTASSIUM 3.7   CHLORIDE 103   CO2 18*   GLUCOSE 112*   BUN 22   CREATININE 0.98   CALCIUM 8.9     Recent Labs     12/27/24 1842    ALTSGPT 177*   ASTSGOT 205*   ALKPHOSPHAT 84   TBILIRUBIN 1.4   GLUCOSE 112*         Recent Labs     12/27/24  1842   NTPROBNP 3310*         Recent Labs     12/27/24  1842   TROPONINT 25*       Imaging:  CT-HEAD W/O   Final Result      1.  No CT evidence of acute infarct, hemorrhage or mass.   2.  Mild to moderate ethmoid sinus disease.            DX-CHEST-PORTABLE (1 VIEW)   Final Result      1.  Wedge-shaped airspace opacity in right medial lung base consistent with pneumonitis and/or atelectasis.a          EKG:  I have personally reviewed the images and compared with prior images.    Assessment/Plan:  Justification for Admission Status  I anticipate this patient will require at least two midnights for appropriate medical management, necessitating inpatient admission because pt has syncope and cap    Patient will need a Telemetry bed on MEDICAL service .  The need is secondary to syncope.    * Syncope and collapse- (present on admission)  Assessment & Plan  Patient presents for a syncopal episodes today at home when trying to ambulate with his walker.  Unable to interrogate in the ED as patient does not know what pacemaker/defibrillator he has  CT head negative, EKG showing paced rhythm.  Syncope secondary to aortic stenosis a possibility, echocardiogram in a.m.  Trend troponin  Telemetry  PT OT    Transaminitis  Assessment & Plan  Noted to have transaminitis with  and   Right upper quadrant ultrasound shows sludge but no evidence of acute cholecystitis.  Hold amiodarone for now    History of ventricular tachycardia  Assessment & Plan  Has history of ventricular tachycardia.  Has pacemaker/defibrillator in place      AF (atrial fibrillation) ([facility])  Assessment & Plan  Continue home medications    Cardiomyopathy ([facility])  Assessment & Plan  Has nonischemic cardiomyopathy  Continue home medications    Heart failure with reduced ejection fraction ([facility])  Assessment & Plan  Per chart review, has improved  ejection fraction, initially 10%, now 30 to 40%  Gentle hydration  Resume home medications    CAP (community acquired pneumonia)  Assessment & Plan  Endorses fever chills and dry cough.  Found to have leukocytosis and elevated procalcitonin.  Chest x-ray showing pneumonitis  Unasyn and azithromycin  Oxygen as needed    ACP (advance care planning)  Assessment & Plan  16 minutes spent discussing goals of care with patient.  We went over the process of CPR, which can include chest compressions and intubation.  At this time he would like to be DNR/DNI        VTE prophylaxis: therapeutic anticoagulation with eliquis

## 2024-12-28 NOTE — ED PROVIDER NOTES
"ER Provider Note    Scribed for Liam Solorio II, M.D. by Naveen Orona. 12/27/2024  6:01 PM    Primary Care Provider: No primary care provider noted.    CHIEF COMPLAINT   Chief Complaint   Patient presents with    Fall     Pt states he lost his balance and fell. Reports he did lose consciousness for \"a few minutes\". - head strike. +thinners, does not know what blood thinner.  Pt reports chronic shoulder pain.    Flu Like Symptoms     X1 week.         HPI/ROS  LIMITATION TO HISTORY   Select: Poor historian.   OUTSIDE HISTORIAN(S):  Significant other Wife provides majority of patient medical history.  See HPI below    Armand Patel is a 74 y.o. male who presents to the ED via EMS for evaluation of a fall as a result of a syncopal event onset this afternoon. The patient reports he lost his balance when he was unable to grab his walker and fell, noting loss of consciousness for a short period after. The patient is not sure if he fell as a result of losing his balance or because he lost consciousness. The wife later reports the patient fell when he lost his footing while grabbing his walker and appeared incoherent after hitting the ground. The patient notes chest pain earlier today. The patient denies feeling sick recently. The patient is on blood thinners. He has a history of heart conditions and has a pacemaker and defibrillator placed in June. The patient states he has only been hospitalized once before for cardiac issues.     The patient's wife reports the patient was seen a year ago in an ED in [place] for COVID where the patient had PVCs, but was discharged on Mucinex and recommended to hydrate. He was seen again for the same and was discharged. The patient drove home to [place] from [place] while having an ejection fraction of 10%. The patient;s wife reports she placed the patient on oxygen and later admitted to the ICU after going to the ED at that time. The patient's wife also notes the patient's ejection " "fraction improved to 30-40%. The wife denies the patient having any recent sick contacts.  But he has appeared sick the past few days, has been coughing.    Attempted to review outside records but [facility] and these records are not linked at this time.    PAST MEDICAL HISTORY  See HPI.  History of CHF.  Has defibrillator and pacemaker.  He and his family do not know what type of pacemaker it is.    SURGICAL HISTORY  Fibrillator and pacemaker.    FAMILY HISTORY  No family history noted.    SOCIAL HISTORY   Lives with his wife    CURRENT MEDICATIONS  Previous Medications    No medications noted.     ALLERGIES  Patient has no allergy information on record.    PHYSICAL EXAM  BP (P) 114/57   Pulse (P) 99   Temp (P) 36.9 °C (98.4 °F) (Temporal)   Resp (P) 16   Ht (P) 1.88 m (6' 2\")   Wt (P) 65.3 kg (144 lb)   SpO2 (P) 92%   BMI (P) 18.49 kg/m²   Physical Exam  Vitals and nursing note reviewed.   HENT:      Head: Normocephalic and atraumatic.      Nose: Nose normal.      Mouth/Throat:      Mouth: Mucous membranes are moist.   Eyes:      Extraocular Movements: Extraocular movements intact.      Pupils: Pupils are equal, round, and reactive to light.   Cardiovascular:      Rate and Rhythm: Normal rate and regular rhythm.      Heart sounds: No murmur heard.  Pulmonary:      Effort: Pulmonary effort is normal. No respiratory distress.      Comments: No Rales  Abdominal:      General: There is no distension.      Tenderness: There is no abdominal tenderness.   Musculoskeletal:         General: No swelling or tenderness. Normal range of motion.      Cervical back: Normal range of motion. No rigidity or tenderness.   Skin:     General: Skin is warm.   Neurological:      General: No focal deficit present.      Mental Status: He is alert and oriented to person, place, and time.      Cranial Nerves: No cranial nerve deficit.      Motor: No weakness.   Psychiatric:         Mood and Affect: Mood normal.     DIAGNOSTIC " STUDIES    EKG/LABS  Results for orders placed or performed during the hospital encounter of 12/27/24   CBC WITH DIFFERENTIAL    Collection Time: 12/27/24  6:42 PM   Result Value Ref Range    WBC 13.0 (H) 4.8 - 10.8 K/uL    RBC 3.71 (L) 4.70 - 6.10 M/uL    Hemoglobin 12.2 (L) 14.0 - 18.0 g/dL    Hematocrit 36.5 (L) 42.0 - 52.0 %    MCV 98.4 (H) 81.4 - 97.8 fL    MCH 32.9 27.0 - 33.0 pg    MCHC 33.4 32.3 - 36.5 g/dL    RDW 46.1 35.9 - 50.0 fL    Platelet Count 294 164 - 446 K/uL    MPV 9.0 9.0 - 12.9 fL    Neutrophils-Polys 80.00 (H) 44.00 - 72.00 %    Lymphocytes 7.00 (L) 22.00 - 41.00 %    Monocytes 8.70 0.00 - 13.40 %    Eosinophils 0.00 0.00 - 6.90 %    Basophils 0.00 0.00 - 1.80 %    Nucleated RBC 0.00 0.00 - 0.20 /100 WBC    Neutrophils (Absolute) 10.96 (H) 1.82 - 7.42 K/uL    Lymphs (Absolute) 0.91 (L) 1.00 - 4.80 K/uL    Monos (Absolute) 1.13 (H) 0.00 - 0.85 K/uL    Eos (Absolute) 0.00 0.00 - 0.51 K/uL    Baso (Absolute) 0.00 0.00 - 0.12 K/uL    NRBC (Absolute) 0.00 K/uL   COMP METABOLIC PANEL    Collection Time: 12/27/24  6:42 PM   Result Value Ref Range    Sodium 136 135 - 145 mmol/L    Potassium 3.7 3.6 - 5.5 mmol/L    Chloride 103 96 - 112 mmol/L    Co2 18 (L) 20 - 33 mmol/L    Anion Gap 15.0 7.0 - 16.0    Glucose 112 (H) 65 - 99 mg/dL    Bun 22 8 - 22 mg/dL    Creatinine 0.98 0.50 - 1.40 mg/dL    Calcium 8.9 8.5 - 10.5 mg/dL    Correct Calcium 9.4 8.5 - 10.5 mg/dL    AST(SGOT) 205 (H) 12 - 45 U/L    ALT(SGPT) 177 (H) 2 - 50 U/L    Alkaline Phosphatase 84 30 - 99 U/L    Total Bilirubin 1.4 0.1 - 1.5 mg/dL    Albumin 3.4 3.2 - 4.9 g/dL    Total Protein 7.8 6.0 - 8.2 g/dL    Globulin 4.4 (H) 1.9 - 3.5 g/dL    A-G Ratio 0.8 g/dL   TROPONIN    Collection Time: 12/27/24  6:42 PM   Result Value Ref Range    Troponin T 25 (H) 6 - 19 ng/L   URINALYSIS,CULTURE IF INDICATED    Collection Time: 12/27/24  6:42 PM    Specimen: Urine   Result Value Ref Range    Color Orange (A)     Character Clear     Specific Gravity  1.028 <1.035    Ph 5.0 5.0 - 8.0    Glucose >=1000 (A) Negative mg/dL    Ketones 15 (A) Negative mg/dL    Protein 30 (A) Negative mg/dL    Bilirubin Small (A) Negative    Urobilinogen, Urine 1.0 <=1.0 EU/dL    Nitrite Negative Negative    Leukocyte Esterase Negative Negative    Occult Blood Trace (A) Negative    Micro Urine Req Microscopic    MAGNESIUM    Collection Time: 24  6:42 PM   Result Value Ref Range    Magnesium 1.9 1.5 - 2.5 mg/dL   proBrain Natriuretic Peptide, NT    Collection Time: 24  6:42 PM   Result Value Ref Range    NT-proBNP 3310 (H) 0 - 125 pg/mL   URINE MICROSCOPIC (W/UA)    Collection Time: 24  6:42 PM   Result Value Ref Range    WBC 0-2 /hpf    RBC 0-2 0 - 2 /hpf    Bacteria None Seen None /hpf    Epithelial Cells 0-2 0 - 5 /hpf    Urine Casts 3-5 (A) 0 - 2 /lpf   ESTIMATED GFR    Collection Time: 24  6:42 PM   Result Value Ref Range    GFR (CKD-EPI) 80 >60 mL/min/1.73 m 2   DIFFERENTIAL MANUAL    Collection Time: 24  6:42 PM   Result Value Ref Range    Bands-Stabs 4.30 0.00 - 10.00 %    Manual Diff Status PERFORMED    PERIPHERAL SMEAR REVIEW    Collection Time: 24  6:42 PM   Result Value Ref Range    Peripheral Smear Review see below    PLATELET ESTIMATE    Collection Time: 24  6:42 PM   Result Value Ref Range    Plt Estimation Normal    MORPHOLOGY    Collection Time: 24  6:42 PM   Result Value Ref Range    RBC Morphology Normal    PROCALCITONIN    Collection Time: 24  6:42 PM   Result Value Ref Range    Procalcitonin 3.50 (H) <0.25 ng/mL   EKG    Collection Time: 24  6:45 PM   Result Value Ref Range    Report       [facility] Emergency Dept.    Test Date:  2024  Pt Name:    KERWIN NGUYEN                Department: ER  MRN:        [id number]                      Room:       [facility]  Gender:     Male                         Technician: [id number]  :        [id number]                   Requested By:ER TRIAGE  PROTOCOL  Order #:    [id number]                    Reading MD:    Measurements  Intervals                                Axis  Rate:       90                           P:          100  AR:         201                          QRS:        -86  QRSD:       132                          T:          110  QT:         387  QTc:        474    Interpretive Statements  Atrial-sensed ventricular-paced rhythm  No further analysis attempted due to paced rhythm  No previous ECG available for comparison     BLOOD CULTURE    Collection Time: 12/27/24  8:40 PM    Specimen: Peripheral; Blood   Result Value Ref Range    Significant Indicator NEG     Source BLD     Site PERIPHERAL     Culture Result       No Growth  Note: Blood cultures are incubated for 5 days and  are monitored continuously.Positive blood cultures  are called to the RN and reported as soon as  they are identified.     BLOOD CULTURE    Collection Time: 12/27/24  8:40 PM    Specimen: Peripheral; Blood   Result Value Ref Range    Significant Indicator NEG     Source BLD     Site PERIPHERAL     Culture Result       No Growth  Note: Blood cultures are incubated for 5 days and  are monitored continuously.Positive blood cultures  are called to the RN and reported as soon as  they are identified.     TROPONIN    Collection Time: 12/27/24  9:46 PM   Result Value Ref Range    Troponin T 20 (H) 6 - 19 ng/L      I have independently interpreted this EKG     RADIOLOGY/PROCEDURES   The attending emergency physician has independently interpreted the diagnostic imaging associated with this visit and am waiting the final reading from the radiologist.   My preliminary interpretation is a follows: CXR: Possible right sided pneumonia    Radiologist interpretation:  US-RUQ   Final Result         1. There is sludge noted within the gallbladder. There is no evidence for cholelithiasis or acute cholecystitis.   2. Right renal cysts.   3. Midline structures are obscured by bowel gas.       CT-HEAD W/O   Final Result      1.  No CT evidence of acute infarct, hemorrhage or mass.   2.  Mild to moderate ethmoid sinus disease.            DX-CHEST-PORTABLE (1 VIEW)   Final Result      1.  Wedge-shaped airspace opacity in right medial lung base consistent with pneumonitis and/or atelectasis.a      EC-ECHOCARDIOGRAM COMPLETE W/O CONT    (Results Pending)     COURSE & MEDICAL DECISION MAKING     ASSESSMENT, COURSE AND PLAN  Care Narrative:     6:09 PM - This is an emergency department evaluation of a 74 y.o. male who presents following a fall and  loss of consciousness onset this afternoon. He has history of CHF, wife reports EF had been as low as 10%. He has defibrillator. Unclear if he had syncopal episode or mechanical fall. According to wife he has been unwell the past few days. Will screen for ICH with CT of the head because he is on anticoagulants, has a fall and cannot recall falling. Plan to chest for metabolic abnormalities. He does seem altered, needs redirection. Wife requested we screen for UTI and viral syndrome. No known history of UTI. Plan to also interrogate pacemaker.     7:59 PM - CT head without hemorrhage. CXR shows airspace abnormality at the right medial lung base. WBC 13. Will give dose of antibiotics to treat pneumonia.  Pro-Jey will be sent.  The patient is on room air with normal saturations, proBNP is elevated. Unclear what baseline proBNP is. Does not look like he is in CHF exacerbation. Plan for admission for syncope and possible pneumonia. Troponin mildly elevated, but not chest pain and EKG does not look like an acute MI. Pagemariusz hospitalist.     8:06 PM - I discussed the patient's case and the above findings with Dr. Stewart (Hospitalist) who agrees to evaluate the patient for hospitalization. He was also able to see some [facility] that show the patient has aortic stenosis which gives further reason for hospitalization.     8:11 PM - The patient was reevaluated at bedside. I  informed the patient of my conversation with Dr. Stewart and my plan for hospitalization.  Pro-Jey returned elevated at 3.5.    PROBLEM LIST  # Syncope/fall with closed head injury   -Unclear if loss of consciousness was due to fall or if he had a syncopal event.  CT of the head without hemorrhage.    # Pneumonia   -Has had a cough, findings of elevated white blood cell count, opacities at the right lung, elevated Pro-Jey    # Elevated AST/ALT   -No right upper quadrant tenderness.  No comparisons.  Could be chronic fatty liver disease.  No known alcohol use recently.    # History of CHF   -proBNP increased, but no comparison.  He does not appear fluid overloaded    DISPOSITION AND DISCUSSIONS  I have discussed management of the patient with the following physicians and ALETHEA's:  Dr. Stewart (Hospitalist)     Discussion of management with other hospitals or appropriate source(s): None     DISPOSITION:  Patient will be hospitalized by Dr. Stewart in guarded condition.    FINAL DIAGNOSIS  1. Pneumonia of right middle lobe due to infectious organism Active   2. Syncope, unspecified syncope type    3. History of CHF (congestive heart failure)      Naveen DAILY (Becki), am scribing for, and in the presence of, WESTON Odell II.    Electronically signed by: Naveen Orona (Becki), 12/27/2024    Liam DAILY II, M* personally performed the services described in this documentation, as scribed by Naveen Orona in my presence, and it is both accurate and complete.     The note accurately reflects work and decisions made by me.  Liam Solorio II, M.D.  12/28/2024  2:56 AM

## 2024-12-28 NOTE — ED TRIAGE NOTES
"Chief Complaint   Patient presents with    Fall     Pt states he lost his balance and fell. Reports he did lose consciousness for \"a few minutes\". - head strike. +thinners, does not know what blood thinner.  Pt reports chronic shoulder pain.    Flu Like Symptoms     X1 week.     BIB EMS from home for above. Pt is AAOx4, speaking in full sentences, answers questions appropriately.    BP (P) 114/57   Pulse (P) 99   Temp (P) 36.9 °C (98.4 °F) (Temporal)   Resp (P) 16   Ht (P) 1.88 m (6' 2\")   Wt (P) 65.3 kg (144 lb)   SpO2 (P) 92%   BMI (P) 18.49 kg/m²     "

## 2024-12-28 NOTE — PROGRESS NOTES
[facility] Medicine Daily Progress Note    Date of Service  12/28/2024    Chief Complaint  Armand Patel is a 74 y.o. male admitted 12/27/2024 with syncope.    Hospital Course  74 y.o. male who presented 12/27/2024 with a syncopal episode.  For the last few days, patient noted to have subjective fever, chills, productive cough.  Today, he lost his balance when trying to get up after trying to grab his walker.  He had a brief episode of loss of consciousness. He continued to feel weak after, he was then brought to the ER for further evaluation.     Patient has had an extensive cardiac history.  He has a history of nonischemic cardiomyopathy with EF 35 to 40% and CRT-D, pulmonary embolism on Eliquis, severe aortic stenosis, ventricular tachycardia on amiodarone     In ER, patient found to have normal vital signs.  Pertinent labs include neutrophil leukocytosis.  Found to have transaminitis  and .  CT head negative.  Chest x-ray showing a right-sided pneumonia.  Patient admitted for syncope and pneumonia.       Interval Problem Update  12/28 patient with tachypnea otherwise vital stable on room air  WBC 12.9, hemoglobin 12  Renal function stable  ,   Troponin 20  Blood cultures pending  Right upper quadrant ultrasound showed sludge no acute cholecystitis  Echo pending   Telemetry reviewed patient paced rhythm  Discussed with PT/OT recommended home health. Noted positive orthostatics, 500 ml bolus ordered   Patient reports he is feeling better overall, still tired feels shaky. Reports cough, no overt dyspnea.     I have discussed this patient's plan of care and discharge plan at IDT rounds today with Case Management, Nursing, Nursing leadership, and other members of the IDT team.    Consultants/Specialty  none    Code Status  DNAR/DNI    Disposition  The patient is not medically cleared for discharge to home or a post-acute facility.  Anticipate discharge to: home with organized home healthcare  and close outpatient follow-up    I have placed the appropriate orders for post-discharge needs.    Review of Systems  Review of Systems   Constitutional:  Positive for malaise/fatigue. Negative for chills and fever.   Respiratory:  Negative for shortness of breath.    Cardiovascular:  Negative for chest pain.   Gastrointestinal:  Negative for abdominal pain and nausea.   Musculoskeletal:  Negative for myalgias.   Skin:  Negative for rash.   Neurological:  Positive for loss of consciousness and weakness. Negative for dizziness and headaches.   Psychiatric/Behavioral:  Negative for depression.    All other systems reviewed and are negative.       Physical Exam  Temp:  [36.5 °C (97.7 °F)-37 °C (98.6 °F)] 36.6 °C (97.9 °F)  Pulse:  [77-99] 77  Resp:  [16-41] 20  BP: (114-140)/(57-78) 123/61  SpO2:  [90 %-94 %] 94 %    Physical Exam  Vitals and nursing note reviewed.   Constitutional:       General: He is not in acute distress.     Appearance: He is ill-appearing.   HENT:      Head: Normocephalic and atraumatic.   Eyes:      Conjunctiva/sclera: Conjunctivae normal.   Cardiovascular:      Rate and Rhythm: Normal rate and regular rhythm.   Pulmonary:      Effort: Pulmonary effort is normal. No respiratory distress.      Breath sounds: Rhonchi (mild) present. No wheezing.   Abdominal:      General: Abdomen is flat. There is no distension.      Palpations: Abdomen is soft.      Tenderness: There is no abdominal tenderness.   Musculoskeletal:         General: Normal range of motion.      Cervical back: Normal range of motion and neck supple.      Right lower leg: No edema.      Left lower leg: No edema.   Skin:     General: Skin is warm and dry.      Findings: No rash.   Neurological:      General: No focal deficit present.      Mental Status: He is alert and oriented to person, place, and time.      Cranial Nerves: No cranial nerve deficit.   Psychiatric:         Mood and Affect: Mood normal.         Behavior: Behavior  normal.         Fluids    Intake/Output Summary (Last 24 hours) at 12/28/2024 1200  Last data filed at 12/28/2024 1000  Gross per 24 hour   Intake 340 ml   Output 300 ml   Net 40 ml        Laboratory  Recent Labs     12/27/24  1842 12/28/24  0352   WBC 13.0* 12.9*   RBC 3.71* 3.60*   HEMOGLOBIN 12.2* 12.0*   HEMATOCRIT 36.5* 35.6*   MCV 98.4* 98.9*   MCH 32.9 33.3*   MCHC 33.4 33.7   RDW 46.1 47.3   PLATELETCT 294 258   MPV 9.0 8.8*     Recent Labs     12/27/24  1842 12/28/24  0352   SODIUM 136 136   POTASSIUM 3.7 3.7   CHLORIDE 103 103   CO2 18* 17*   GLUCOSE 112* 107*   BUN 22 22   CREATININE 0.98 0.95   CALCIUM 8.9 8.9                   Imaging  US-RUQ   Final Result         1. There is sludge noted within the gallbladder. There is no evidence for cholelithiasis or acute cholecystitis.   2. Right renal cysts.   3. Midline structures are obscured by bowel gas.      CT-HEAD W/O   Final Result      1.  No CT evidence of acute infarct, hemorrhage or mass.   2.  Mild to moderate ethmoid sinus disease.            DX-CHEST-PORTABLE (1 VIEW)   Final Result      1.  Wedge-shaped airspace opacity in right medial lung base consistent with pneumonitis and/or atelectasis.a      EC-ECHOCARDIOGRAM COMPLETE W/O CONT    (Results Pending)        Assessment/Plan  * Syncope and collapse- (present on admission)  Assessment & Plan  Likely orthostatic with infection     Patient presents for a syncopal episodes today at home when trying to ambulate with his walker.  Unable to interrogate in the ED as patient does not know what pacemaker/defibrillator he has  CT head negative, EKG showing paced rhythm.  Syncope secondary to aortic stenosis a possibility, echocardiogram in a.m.  Trend troponin negative  Orthostatics +, 500 ml bolus  Monitor closely for fluid overload    Telemetry  PT OT    Transaminitis  Assessment & Plan  Noted to have transaminitis with  and   Right upper quadrant ultrasound shows sludge but no evidence of  acute cholecystitis.  Hold amiodarone for now    History of ventricular tachycardia  Assessment & Plan  Has history of ventricular tachycardia.  Has pacemaker/defibrillator in place      AF (atrial fibrillation) ([facility])  Assessment & Plan  Continue home medications    Cardiomyopathy ([facility])  Assessment & Plan  Has nonischemic cardiomyopathy  Continue home medications    Heart failure with reduced ejection fraction ([facility])  Assessment & Plan  Per chart review, has improved ejection fraction, initially 10%, now 30 to 40%  Gentle hydration  Resume home medications    CAP (community acquired pneumonia)  Assessment & Plan  Endorses fever chills and dry cough.  Found to have leukocytosis and elevated procalcitonin.  Chest x-ray showing pneumonitis  Unasyn and azithromycin  Oxygen as needed    ACP (advance care planning)  Assessment & Plan  Discussed on admit DNR/DNI    Total time spent in chart review, at bedside with the patient, discussing with therapy, nursing and case management: 51 minutes    VTE prophylaxis:    therapeutic anticoagulation with eliquis 5 mg BID    I have performed a physical exam and reviewed and updated ROS and Plan today (12/28/2024). In review of yesterday's note (12/27/2024), there are no changes except as documented above.

## 2024-12-28 NOTE — ASSESSMENT & PLAN NOTE
Likely orthostatic with infection     Patient presents for a syncopal episodes today at home when trying to ambulate with his walker.  Unable to interrogate in the ED as patient does not know what pacemaker/defibrillator he has  CT head negative, EKG showing paced rhythm.  Syncope secondary to aortic stenosis a possibility, echocardiogram in a.m.  Trend troponin negative  Orthostatics +, 500 ml bolus  Monitor closely for fluid overload    Telemetry  PT OT

## 2024-12-29 ENCOUNTER — PHARMACY VISIT (OUTPATIENT)
Dept: PHARMACY | Facility: MEDICAL CENTER | Age: 74
End: 2024-12-29
Payer: COMMERCIAL

## 2024-12-29 ENCOUNTER — HOME HEALTH ADMISSION (OUTPATIENT)
Dept: HOME HEALTH SERVICES | Facility: HOME HEALTHCARE | Age: 74
End: 2024-12-29
Payer: MEDICARE

## 2024-12-29 VITALS
RESPIRATION RATE: 15 BRPM | WEIGHT: 134.7 LBS | OXYGEN SATURATION: 94 % | HEIGHT: 74 IN | BODY MASS INDEX: 17.29 KG/M2 | SYSTOLIC BLOOD PRESSURE: 128 MMHG | TEMPERATURE: 97.2 F | DIASTOLIC BLOOD PRESSURE: 73 MMHG | HEART RATE: 64 BPM

## 2024-12-29 LAB
ALBUMIN SERPL BCP-MCNC: 2.8 G/DL (ref 3.2–4.9)
BUN SERPL-MCNC: 24 MG/DL (ref 8–22)
CALCIUM ALBUM COR SERPL-MCNC: 9.2 MG/DL (ref 8.5–10.5)
CALCIUM SERPL-MCNC: 8.2 MG/DL (ref 8.5–10.5)
CHLORIDE SERPL-SCNC: 105 MMOL/L (ref 96–112)
CO2 SERPL-SCNC: 21 MMOL/L (ref 20–33)
CREAT SERPL-MCNC: 0.92 MG/DL (ref 0.5–1.4)
ERYTHROCYTE [DISTWIDTH] IN BLOOD BY AUTOMATED COUNT: 48.9 FL (ref 35.9–50)
GFR SERPLBLD CREATININE-BSD FMLA CKD-EPI: 86 ML/MIN/1.73 M 2
GLUCOSE SERPL-MCNC: 140 MG/DL (ref 65–99)
HCT VFR BLD AUTO: 31.9 % (ref 42–52)
HGB BLD-MCNC: 10.4 G/DL (ref 14–18)
MCH RBC QN AUTO: 32.7 PG (ref 27–33)
MCHC RBC AUTO-ENTMCNC: 32.6 G/DL (ref 32.3–36.5)
MCV RBC AUTO: 100.3 FL (ref 81.4–97.8)
PHOSPHATE SERPL-MCNC: 1.9 MG/DL (ref 2.5–4.5)
PLATELET # BLD AUTO: 270 K/UL (ref 164–446)
PMV BLD AUTO: 9.3 FL (ref 9–12.9)
POTASSIUM SERPL-SCNC: 3.4 MMOL/L (ref 3.6–5.5)
RBC # BLD AUTO: 3.18 M/UL (ref 4.7–6.1)
SODIUM SERPL-SCNC: 135 MMOL/L (ref 135–145)
WBC # BLD AUTO: 12.5 K/UL (ref 4.8–10.8)

## 2024-12-29 PROCEDURE — 99239 HOSP IP/OBS DSCHRG MGMT >30: CPT | Performed by: STUDENT IN AN ORGANIZED HEALTH CARE EDUCATION/TRAINING PROGRAM

## 2024-12-29 PROCEDURE — A9270 NON-COVERED ITEM OR SERVICE: HCPCS | Performed by: STUDENT IN AN ORGANIZED HEALTH CARE EDUCATION/TRAINING PROGRAM

## 2024-12-29 PROCEDURE — 700102 HCHG RX REV CODE 250 W/ 637 OVERRIDE(OP): Performed by: STUDENT IN AN ORGANIZED HEALTH CARE EDUCATION/TRAINING PROGRAM

## 2024-12-29 PROCEDURE — 700105 HCHG RX REV CODE 258: Performed by: INTERNAL MEDICINE

## 2024-12-29 PROCEDURE — 85027 COMPLETE CBC AUTOMATED: CPT

## 2024-12-29 PROCEDURE — RXMED WILLOW AMBULATORY MEDICATION CHARGE: Performed by: STUDENT IN AN ORGANIZED HEALTH CARE EDUCATION/TRAINING PROGRAM

## 2024-12-29 PROCEDURE — 36415 COLL VENOUS BLD VENIPUNCTURE: CPT

## 2024-12-29 PROCEDURE — 700111 HCHG RX REV CODE 636 W/ 250 OVERRIDE (IP): Mod: JZ | Performed by: INTERNAL MEDICINE

## 2024-12-29 PROCEDURE — 80069 RENAL FUNCTION PANEL: CPT

## 2024-12-29 RX ORDER — ACETAMINOPHEN 500 MG
1000 TABLET ORAL EVERY 6 HOURS PRN
COMMUNITY
Start: 2024-12-29 | End: 2025-01-13

## 2024-12-29 RX ADMIN — MEXILETINE HYDROCHLORIDE 150 MG: 150 CAPSULE ORAL at 05:44

## 2024-12-29 RX ADMIN — AMPICILLIN AND SULBACTAM 3 G: 1; 2 INJECTION, POWDER, FOR SOLUTION INTRAMUSCULAR; INTRAVENOUS at 11:50

## 2024-12-29 RX ADMIN — AMPICILLIN AND SULBACTAM 3 G: 1; 2 INJECTION, POWDER, FOR SOLUTION INTRAMUSCULAR; INTRAVENOUS at 00:17

## 2024-12-29 RX ADMIN — SACUBITRIL AND VALSARTAN 0.5 TABLET: 24; 26 TABLET, FILM COATED ORAL at 05:44

## 2024-12-29 RX ADMIN — SPIRONOLACTONE 12.5 MG: 25 TABLET ORAL at 05:44

## 2024-12-29 RX ADMIN — APIXABAN 5 MG: 5 TABLET, FILM COATED ORAL at 05:44

## 2024-12-29 RX ADMIN — METOPROLOL SUCCINATE 12.5 MG: 25 TABLET, EXTENDED RELEASE ORAL at 05:44

## 2024-12-29 RX ADMIN — AMPICILLIN AND SULBACTAM 3 G: 1; 2 INJECTION, POWDER, FOR SOLUTION INTRAMUSCULAR; INTRAVENOUS at 05:49

## 2024-12-29 RX ADMIN — AZITHROMYCIN DIHYDRATE 500 MG: 250 TABLET ORAL at 11:47

## 2024-12-29 ASSESSMENT — FIBROSIS 4 INDEX: FIB4 SCORE: 7.74

## 2024-12-29 NOTE — DISCHARGE PLANNING
ATTN: Case Management  RE: Referral for Home Health    As of 12/29/2024, we have accepted the Home Health referral for the patient listed above.    [facility]  will contact the patient within 48 hours. If you have any questions or concerns regarding the patient's transition to Home Health, please do not hesitate to contact us at [phone].      We look forward to collaborating with you,  [facility] Team

## 2024-12-29 NOTE — DISCHARGE PLANNING
Thank you for alerting [facility] to this patient. This patient does not currently have a PCP. This referral has been placed on hold pending an appt to establish with a PCP.     Please call [phone] with any questions

## 2024-12-29 NOTE — DISCHARGE INSTRUCTIONS
Discharge Instructions per Jian Aldrich M.D.    You were hospitalized for a pneumonia (community-acquired pneumonia) which caused weakness and loss of consciousness (syncope). Heart evaluation seems to be good and your symptoms are more likely a reaction to the acute illness (pneumonia).    You will follow up with your cardiology for any and all further electrical, structural, or valve issues as they are not emergent. Please call and notify him that you were seen in the hospital for pneumonia.    DIET: Regular    ACTIVITY: Regular    DIAGNOSIS: Community-Acquired Pneumonia    Return to ER if you faint for any reason, develop additional fever (>100.4 F or >38 C), develop sudden chest pain or shortness of breath.

## 2024-12-29 NOTE — CARE PLAN
The patient is Stable - Low risk of patient condition declining or worsening    Shift Goals  Clinical Goals: echo, pain control  Patient Goals: home, rest  Family Goals: updates    Progress made toward(s) clinical / shift goals:  Patient continues to verbalize needs and verbalize understanding of POC. RN to continue purposeful rounding, ensure patient safety, and notify MD of any changes PRN.      Problem: Pain - Standard  Goal: Alleviation of pain or a reduction in pain to the patient’s comfort goal  Outcome: Progressing     Problem: Knowledge Deficit - Standard  Goal: Patient and family/care givers will demonstrate understanding of plan of care, disease process/condition, diagnostic tests and medications  Outcome: Progressing     Problem: Depression  Goal: Patient and family/caregiver will verbalize accurate information about at least two of the possible causes of depression, three-four of the signs and symptoms of depression  Outcome: Progressing     Problem: Fall Risk  Goal: Patient will remain free from falls  Outcome: Progressing     Patient is not progressing towards the following goals:

## 2024-12-29 NOTE — CARE PLAN
The patient is Stable - Low risk of patient condition declining or worsening    Shift Goals  Clinical Goals: echo, safety  Patient Goals: rest  Family Goals: kory    Progress made toward(s) clinical / shift goals:      Problem: Knowledge Deficit - Standard  Goal: Patient and family/care givers will demonstrate understanding of plan of care, disease process/condition, diagnostic tests and medications  Outcome: Progressing     Problem: Pain - Standard  Goal: Alleviation of pain or a reduction in pain to the patient’s comfort goal  Outcome: Progressing     Problem: Fall Risk  Goal: Patient will remain free from falls  Outcome: Progressing       Patient is not progressing towards the following goals: N/A

## 2024-12-29 NOTE — PROGRESS NOTES
Monitor summary: SR & paced 71-78, MI -, QRS -0.14, QT -, with rare PVCs per strip from the monitor room.

## 2024-12-29 NOTE — PROGRESS NOTES
Monitor summary: Paced 62-80, IN -0.13, QRS -0.12, QT -0.41, with a BBB per strip from the monitor room.

## 2024-12-29 NOTE — DISCHARGE SUMMARY
"Discharge Summary    CHIEF COMPLAINT ON ADMISSION  Chief Complaint   Patient presents with    Fall     Pt states he lost his balance and fell. Reports he did lose consciousness for \"a few minutes\". - head strike. +thinners, does not know what blood thinner.  Pt reports chronic shoulder pain.    Flu Like Symptoms     X1 week.       Reason for Admission  Syncope, CAP    Admission Date  12/27/2024    CODE STATUS  DNAR/DNI    HPI & HOSPITAL COURSE  This is a 74 y.o. male with NICM (improved LVEF from 10% to 40%), s/p PPM/ICD/CRT-D, h/o PE on apixaban, h/o Vtach on amiodarone here with weakness and syncope.    He presented after a few days of fever, chills, and productive cough after trying to get up to use his walker and lost consciousness. In the ED his vitals were normal. CXR demonstrated a Right-sided infiltrate suggestive of pneumonia. Procalcitonin was elevated. He was admitted for syncope and initiated on IV antibiotics. Telemetry was without events. He had resolution of leukocytosis and improvement in energy for which he was transitioned after 48h of negative blood cultures to augmentin to complete a 5-day course. TTE was deferred due to no audible murmur and recent TTE / [facility].    Therefore, he is discharged in fair and stable condition to home with organized home healthcare and close outpatient follow-up.    The patient met 2-midnight criteria for an inpatient stay at the time of discharge.    Discharge Date  12/30/2024    FOLLOW UP ITEMS POST DISCHARGE    CAP - complete antibiotic course    NICM - continue GDMT, follow up with cardiologist    DISCHARGE DIAGNOSES  Principal Problem (Resolved):    Syncope and collapse (POA: Yes)  Active Problems:    Community acquired pneumonia of right middle lobe of lung (POA: Yes)    Chronic HFrEF (heart failure with reduced ejection fraction) (HCC) (POA: Yes)    NICM (nonischemic cardiomyopathy) (HCC) (POA: Yes)    Longstanding persistent " atrial fibrillation (HCC) (POA: Yes)    History of ventricular tachycardia (POA: Yes)      FOLLOW UP  No future appointments.  [facility]  [address] [address] [phone]  Schedule an appointment as soon as possible for a visit in 2 week(s)  after-hospital follow up      MEDICATIONS ON DISCHARGE     Medication List        START taking these medications        Instructions   acetaminophen 500 MG Tabs  Commonly known as: Tylenol   Take 2 Tablets by mouth every 6 hours as needed for Mild Pain.  Dose: 1,000 mg     amoxicillin-clavulanate 875-125 MG Tabs  Commonly known as: Augmentin   Take 1 Tablet by mouth 2 times a day. Start evening 12/29/24  Dose: 1 Tablet            CONTINUE taking these medications        Instructions   amiodarone 200 MG Tabs  Commonly known as: Cordarone   Take 200 mg by mouth every day.  Dose: 200 mg     apixaban 5mg Tabs  Commonly known as: Eliquis   Take 5 mg by mouth 2 times a day.  Dose: 5 mg     Empagliflozin 25 MG Tabs   Take 12.5 mg by mouth 2 times a day. 12.5 mg = 1/2 tablet  Dose: 12.5 mg     Entresto 24-26 MG Tabs  Generic drug: sacubitril-valsartan   Take 0.5 Tablets by mouth 2 times a day. 0.5 = 1/2 tablet  Dose: 0.5 Tablet     eplerenone 25 MG Tabs  Commonly known as: Inspra   Take 25 mg by mouth every day.  Dose: 25 mg     fluticasone 50 MCG/ACT nasal spray  Commonly known as: Flonase   Administer 1 Spray into affected nostril(S) every day.  Dose: 1 Spray     lidocaine 5 % Ptch  Commonly known as: Lidoderm   Place 1 Patch on the skin every 24 hours.  Dose: 1 Patch     megestrol 40 MG Tabs  Commonly known as: Megace   Take 20 mg by mouth 4 times a day. 20 mg = 1/2 tablet  Dose: 20 mg     metoprolol SR 25 MG Tb24  Commonly known as: Toprol XL   Take 12.5 mg by mouth every day. 12.5 mg = 1/2 tablet  Dose: 12.5 mg     mexiletine 150 MG Caps  Commonly known as: Mexitil   Take 150 mg by mouth 3 times a day.  Dose: 150 mg      multivitamin Tabs   Take 1 Tablet by mouth every day.  Dose: 1 Tablet              Allergies  No Known Allergies    DIET  Orders Placed This Encounter   Procedures    Diet Order Diet: Regular     Standing Status:   Standing     Number of Occurrences:   1     Order Specific Question:   Diet:     Answer:   Regular [1]       ACTIVITY  As tolerated.  Weight bearing as tolerated    CONSULTATIONS  None    PROCEDURES  None    LABORATORY  Lab Results   Component Value Date    SODIUM 135 12/29/2024    POTASSIUM 3.4 (L) 12/29/2024    CHLORIDE 105 12/29/2024    CO2 21 12/29/2024    GLUCOSE 140 (H) 12/29/2024    BUN 24 (H) 12/29/2024    CREATININE 0.92 12/29/2024        Lab Results   Component Value Date    WBC 12.5 (H) 12/29/2024    HEMOGLOBIN 10.4 (L) 12/29/2024    HEMATOCRIT 31.9 (L) 12/29/2024    PLATELETCT 270 12/29/2024        Total time of the discharge process exceeds 36 minutes.

## 2024-12-30 PROBLEM — I42.8 NICM (NONISCHEMIC CARDIOMYOPATHY) (HCC): Status: ACTIVE | Noted: 2024-12-27

## 2024-12-30 PROBLEM — I50.22 CHRONIC HFREF (HEART FAILURE WITH REDUCED EJECTION FRACTION) (HCC): Status: ACTIVE | Noted: 2024-12-27

## 2024-12-30 PROBLEM — R55 SYNCOPE AND COLLAPSE: Status: RESOLVED | Noted: 2024-12-27 | Resolved: 2024-12-30

## 2024-12-30 PROBLEM — I48.11 LONGSTANDING PERSISTENT ATRIAL FIBRILLATION (HCC): Status: ACTIVE | Noted: 2024-12-27

## 2025-01-01 LAB
BACTERIA BLD CULT: NORMAL
BACTERIA BLD CULT: NORMAL
SIGNIFICANT IND 70042: NORMAL
SIGNIFICANT IND 70042: NORMAL
SITE SITE: NORMAL
SITE SITE: NORMAL
SOURCE SOURCE: NORMAL
SOURCE SOURCE: NORMAL